# Patient Record
Sex: MALE | Race: BLACK OR AFRICAN AMERICAN | Employment: OTHER | URBAN - METROPOLITAN AREA
[De-identification: names, ages, dates, MRNs, and addresses within clinical notes are randomized per-mention and may not be internally consistent; named-entity substitution may affect disease eponyms.]

---

## 2022-10-12 ENCOUNTER — APPOINTMENT (OUTPATIENT)
Dept: GENERAL RADIOLOGY | Age: 72
DRG: 809 | End: 2022-10-12
Attending: PHYSICIAN ASSISTANT
Payer: MEDICARE

## 2022-10-12 ENCOUNTER — HOSPITAL ENCOUNTER (INPATIENT)
Age: 72
LOS: 2 days | Discharge: HOME | DRG: 809 | End: 2022-10-14
Attending: EMERGENCY MEDICINE | Admitting: HOSPITALIST
Payer: MEDICARE

## 2022-10-12 DIAGNOSIS — M10.9 ACUTE GOUT OF RIGHT FOOT, UNSPECIFIED CAUSE: ICD-10-CM

## 2022-10-12 DIAGNOSIS — D61.818 PANCYTOPENIA (HCC): Primary | ICD-10-CM

## 2022-10-12 DIAGNOSIS — I10 PRIMARY HYPERTENSION: ICD-10-CM

## 2022-10-12 PROBLEM — I88.0 MESENTERIC LYMPHADENITIS: Status: ACTIVE | Noted: 2022-10-12

## 2022-10-12 PROBLEM — K76.6 PORTAL HYPERTENSION (HCC): Status: ACTIVE | Noted: 2022-10-12

## 2022-10-12 PROBLEM — R16.1 SPLENOMEGALY: Status: ACTIVE | Noted: 2022-10-12

## 2022-10-12 PROBLEM — I85.00 ESOPHAGEAL VARICES (HCC): Status: ACTIVE | Noted: 2022-10-12

## 2022-10-12 PROBLEM — N40.0 PROSTATE ENLARGEMENT: Status: ACTIVE | Noted: 2022-10-12

## 2022-10-12 PROBLEM — D45 POLYCYTHEMIA VERA (HCC): Status: ACTIVE | Noted: 2022-10-12

## 2022-10-12 LAB
ALBUMIN SERPL-MCNC: 3 G/DL (ref 3.4–5)
ALBUMIN/GLOB SERPL: 0.9 {RATIO} (ref 0.8–1.7)
ALP SERPL-CCNC: 43 U/L (ref 45–117)
ALT SERPL-CCNC: 18 U/L (ref 16–61)
ANION GAP SERPL CALC-SCNC: 4 MMOL/L (ref 3–18)
AST SERPL-CCNC: 19 U/L (ref 10–38)
BASOPHILS # BLD: 0 K/UL (ref 0–0.1)
BASOPHILS # BLD: 0 K/UL (ref 0–0.1)
BASOPHILS NFR BLD: 0 % (ref 0–2)
BASOPHILS NFR BLD: 0 % (ref 0–2)
BILIRUB DIRECT SERPL-MCNC: 0.5 MG/DL (ref 0–0.2)
BILIRUB SERPL-MCNC: 1.3 MG/DL (ref 0.2–1)
BUN SERPL-MCNC: 25 MG/DL (ref 7–18)
BUN/CREAT SERPL: 20 (ref 12–20)
CALCIUM SERPL-MCNC: 8.7 MG/DL (ref 8.5–10.1)
CHLORIDE SERPL-SCNC: 109 MMOL/L (ref 100–111)
CO2 SERPL-SCNC: 28 MMOL/L (ref 21–32)
CREAT SERPL-MCNC: 1.24 MG/DL (ref 0.6–1.3)
DIFFERENTIAL METHOD BLD: ABNORMAL
DIFFERENTIAL METHOD BLD: ABNORMAL
EOSINOPHIL # BLD: 0.1 K/UL (ref 0–0.4)
EOSINOPHIL # BLD: 0.1 K/UL (ref 0–0.4)
EOSINOPHIL NFR BLD: 3 % (ref 0–5)
EOSINOPHIL NFR BLD: 4 % (ref 0–5)
ERYTHROCYTE [DISTWIDTH] IN BLOOD BY AUTOMATED COUNT: 24.5 % (ref 11.6–14.5)
ERYTHROCYTE [DISTWIDTH] IN BLOOD BY AUTOMATED COUNT: 24.9 % (ref 11.6–14.5)
GLOBULIN SER CALC-MCNC: 3.4 G/DL (ref 2–4)
GLUCOSE SERPL-MCNC: 145 MG/DL (ref 74–99)
HCT VFR BLD AUTO: 27.4 % (ref 36–48)
HCT VFR BLD AUTO: 28.8 % (ref 36–48)
HEMOCCULT STL QL: NEGATIVE
HGB BLD-MCNC: 9.1 G/DL (ref 13–16)
HGB BLD-MCNC: 9.5 G/DL (ref 13–16)
IMM GRANULOCYTES # BLD AUTO: 0.2 K/UL (ref 0–0.04)
IMM GRANULOCYTES # BLD AUTO: 0.3 K/UL (ref 0–0.04)
IMM GRANULOCYTES NFR BLD AUTO: 7 % (ref 0–0.5)
IMM GRANULOCYTES NFR BLD AUTO: 8 % (ref 0–0.5)
INR PPP: 1.3 (ref 0.8–1.2)
LACTATE BLD-SCNC: 1.28 MMOL/L (ref 0.4–2)
LYMPHOCYTES # BLD: 0.5 K/UL (ref 0.9–3.6)
LYMPHOCYTES # BLD: 0.6 K/UL (ref 0.9–3.6)
LYMPHOCYTES NFR BLD: 17 % (ref 21–52)
LYMPHOCYTES NFR BLD: 20 % (ref 21–52)
MCH RBC QN AUTO: 29.3 PG (ref 24–34)
MCH RBC QN AUTO: 29.4 PG (ref 24–34)
MCHC RBC AUTO-ENTMCNC: 33 G/DL (ref 31–37)
MCHC RBC AUTO-ENTMCNC: 33.2 G/DL (ref 31–37)
MCV RBC AUTO: 88.7 FL (ref 78–100)
MCV RBC AUTO: 88.9 FL (ref 78–100)
MONOCYTES # BLD: 0.1 K/UL (ref 0.05–1.2)
MONOCYTES # BLD: 0.2 K/UL (ref 0.05–1.2)
MONOCYTES NFR BLD: 4 % (ref 3–10)
MONOCYTES NFR BLD: 5 % (ref 3–10)
NEUTS SEG # BLD: 1.9 K/UL (ref 1.8–8)
NEUTS SEG # BLD: 2.2 K/UL (ref 1.8–8)
NEUTS SEG NFR BLD: 64 % (ref 40–73)
NEUTS SEG NFR BLD: 68 % (ref 40–73)
NRBC # BLD: 0 K/UL (ref 0–0.01)
NRBC # BLD: 0 K/UL (ref 0–0.01)
NRBC BLD-RTO: 0 PER 100 WBC
NRBC BLD-RTO: 0 PER 100 WBC
PLATELET # BLD AUTO: 30 K/UL (ref 135–420)
PLATELET # BLD AUTO: 30 K/UL (ref 135–420)
PLATELET COMMENTS,PCOM: ABNORMAL
PLATELET COMMENTS,PCOM: ABNORMAL
POTASSIUM SERPL-SCNC: 3.8 MMOL/L (ref 3.5–5.5)
PROT SERPL-MCNC: 6.4 G/DL (ref 6.4–8.2)
PROTHROMBIN TIME: 16.3 SEC (ref 11.5–15.2)
RBC # BLD AUTO: 3.09 M/UL (ref 4.35–5.65)
RBC # BLD AUTO: 3.24 M/UL (ref 4.35–5.65)
RBC MORPH BLD: ABNORMAL
SODIUM SERPL-SCNC: 141 MMOL/L (ref 136–145)
URATE SERPL-MCNC: 6.1 MG/DL (ref 2.6–7.2)
WBC # BLD AUTO: 3 K/UL (ref 4.6–13.2)
WBC # BLD AUTO: 3.2 K/UL (ref 4.6–13.2)

## 2022-10-12 PROCEDURE — 84550 ASSAY OF BLOOD/URIC ACID: CPT

## 2022-10-12 PROCEDURE — 83605 ASSAY OF LACTIC ACID: CPT

## 2022-10-12 PROCEDURE — 74011250637 HC RX REV CODE- 250/637: Performed by: HOSPITALIST

## 2022-10-12 PROCEDURE — 73630 X-RAY EXAM OF FOOT: CPT

## 2022-10-12 PROCEDURE — 88185 FLOWCYTOMETRY/TC ADD-ON: CPT

## 2022-10-12 PROCEDURE — 99285 EMERGENCY DEPT VISIT HI MDM: CPT

## 2022-10-12 PROCEDURE — 85610 PROTHROMBIN TIME: CPT

## 2022-10-12 PROCEDURE — 88184 FLOWCYTOMETRY/ TC 1 MARKER: CPT

## 2022-10-12 PROCEDURE — 82272 OCCULT BLD FECES 1-3 TESTS: CPT

## 2022-10-12 PROCEDURE — 36415 COLL VENOUS BLD VENIPUNCTURE: CPT

## 2022-10-12 PROCEDURE — 65270000029 HC RM PRIVATE

## 2022-10-12 PROCEDURE — 73110 X-RAY EXAM OF WRIST: CPT

## 2022-10-12 PROCEDURE — 85025 COMPLETE CBC W/AUTO DIFF WBC: CPT

## 2022-10-12 PROCEDURE — 73130 X-RAY EXAM OF HAND: CPT

## 2022-10-12 PROCEDURE — 80076 HEPATIC FUNCTION PANEL: CPT

## 2022-10-12 PROCEDURE — 80048 BASIC METABOLIC PNL TOTAL CA: CPT

## 2022-10-12 RX ORDER — DIPHENHYDRAMINE HCL 12.5MG/5ML
12.5 LIQUID (ML) ORAL
COMMUNITY

## 2022-10-12 RX ORDER — NAPROXEN 500 MG/1
500 TABLET ORAL 2 TIMES DAILY WITH MEALS
COMMUNITY

## 2022-10-12 RX ORDER — HYDROXYUREA 500 MG/1
1000 CAPSULE ORAL DAILY
COMMUNITY
End: 2022-10-14

## 2022-10-12 RX ORDER — TAMSULOSIN HYDROCHLORIDE 0.4 MG/1
0.4 CAPSULE ORAL DAILY
COMMUNITY

## 2022-10-12 RX ORDER — DICLOFENAC SODIUM 10 MG/G
GEL TOPICAL AS NEEDED
COMMUNITY

## 2022-10-12 RX ORDER — LOSARTAN POTASSIUM 50 MG/1
100 TABLET ORAL DAILY
Status: DISCONTINUED | OUTPATIENT
Start: 2022-10-13 | End: 2022-10-14 | Stop reason: HOSPADM

## 2022-10-12 RX ORDER — PREDNISONE 20 MG/1
20 TABLET ORAL
Status: DISCONTINUED | OUTPATIENT
Start: 2022-10-13 | End: 2022-10-14 | Stop reason: HOSPADM

## 2022-10-12 RX ORDER — TRAMADOL HYDROCHLORIDE 50 MG/1
50 TABLET ORAL 2 TIMES DAILY
COMMUNITY

## 2022-10-12 RX ORDER — TRAMADOL HYDROCHLORIDE 50 MG/1
50 TABLET ORAL 2 TIMES DAILY
Status: DISCONTINUED | OUTPATIENT
Start: 2022-10-12 | End: 2022-10-14 | Stop reason: HOSPADM

## 2022-10-12 RX ADMIN — TRAMADOL HYDROCHLORIDE 50 MG: 50 TABLET ORAL at 18:42

## 2022-10-12 NOTE — PROGRESS NOTES
Assumed patient care. Patient arrived from ED. Patient is alert and oriented, resting on bed, family in room with patient. Patient is been oriented to room. Bed at low position, wheels locked and call light within reach.

## 2022-10-12 NOTE — ED PROVIDER NOTES
EMERGENCY DEPARTMENT HISTORY & PHYSICAL EXAM    THE [facility] EMERGENCY DEPT  10/12/2022, 9:56 AM    Clinical Impression:  1. Pancytopenia ([address]    2. Acute gout of right foot, unspecified cause    3. Primary hypertension        Assessment/Differential Diagnosis:     Ddx arthritic process, trauma, infection, gout, all considered    ED Course:   Initial assessment performed. The patients presenting problems have been discussed, and they are in agreement with the care plan formulated and outlined with them. I have encouraged them to ask questions as they arise throughout their visit. Patient comes to ED with son-in-law. He complains of right great toe pain with swelling and warmth for several weeks. No known trauma. He believes this has happened before. No history of gout. Patient also complains of several week history of left hand pain along the medial aspect into the wrist.  He believes he may have hit his hand several weeks ago at the start of pain. No fever, chills or flulike illness. He is otherwise feeling well other than pain to these 2 areas. Exam with elderly gentleman who appears well. Blood pressure 98/70. Afebrile. Patient appears nontoxic. Right foot with erythematous warm swollen tender area at his first MTP. Obvious bony defect. Toes are neurovascular intact. No open lesions or sores. Examination of his left hand reveals significant deformity, likely arthritic changes at the end and third MCP. There is no pain to this area. Patient has point tenderness at the fifth metacarpal and MCP. No obvious bony defect. Slight swelling is noted. Significant pain with attempting to flex. Flexors intact. Patient also has some slight tenderness at the snuffbox. Skin intact with no signs of obvious trauma. Reviewed patient's records and he usually has a blood pressure between 682 and 533 systolic.   His blood pressure today is 98/70 which is close to his baseline, but given his age and warm joint we will check some basic blood work, lactic acid and x-rays    Work-up reveals patient is pancytopenic with a platelet count of 30. Discussion with patient and son-in-law. No history of blood disorders. Never seen by hematologist.  No history of anemia or blood transfusions. He denies any recent episodes of bleeding. No black or bloody stools. Discussed case with hospitalist, Dr. Yunior Padron, who requests repeat CBC    Repeat CBC reveals again pancytopenia    Discussed with hospitalist, Dr. Yunior Padron, who will admit. No request for further evaluation or treatment. Medical Chart Review:  I have reviewed triage nursing documentation. Review of old medical records with the following pertinent information:       Disposition: admit. Chief Complaint   Patient presents with    Foot Pain    Hand Pain     HPI:    The history is provided by patient and son lucian. No  used. Lynette Jeffrey is a 81 y.o. male presenting to the Emergency Department with complaints of right foot pain for the past several weeks to month. Patient recalls having similar pain in this area before. No history of gout that he can recall. He denies any trauma. He has noticed some warmth, redness and tenderness at the base of his right great toe. No other areas of pain. He denies any open sores or lesions. No ankle, calf, knee or hip pain. No medication taken for symptoms. He is also complaining of left hand pain for the past several weeks. He states he may have hit his hand a few weeks ago when he tried to bat a butterfly. He is having pain and swelling along the medial aspect of his hand into his wrist.  No previous similar pain. He is right-hand dominant. He denies any open sores or lesions. Sign patient states he has been feeling well otherwise. He denies any shortness of breath, chest pain, feeling lightheaded, fever, chills or flulike illness.   Patient does have history of hypertension for which she takes medications. I have reviewed all PMHX, FMHX and Social Hx as entered into the medical record in the chart below using the Epic Template. Review of Systems:  Constitutional: neg for fever, chills  ENT:  neg for URI symptoms  Respiratory:  neg for cough, shortness of breath  Cardiovascular:  neg for chest pain  GI:  neg for abdominal pain. :  No Flank pain. MSK: positive for trauma. Integumentary: no skin trauma  Neurological: neg for headaches  All other systems reviewed negative with exception of positives in ROS and HPI. Past Medical History:  Past Medical History:   Diagnosis Date    Arthritis     Hypertension        Past Surgical History:  Past Surgical History:   Procedure Laterality Date    HX ORTHOPAEDIC Left 1970's    hand surgery    HX ORTHOPAEDIC  2015    buldging disk in neck       Family History:  History reviewed. No pertinent family history. Social History:  Social History     Tobacco Use    Smoking status: Former    Smokeless tobacco: Never   Substance Use Topics    Alcohol use: No    Drug use: No       Allergies:  No Known Allergies    Vital Signs:  Vitals:    10/12/22 0919 10/12/22 1417   BP: 98/70 117/65   Pulse: 80 82   Resp: 17 18   Temp: 98.1 °F (36.7 °C)    SpO2: 99% 98%   Weight: 59 kg (130 lb)    Height: 5' 6\" (1.676 m)      Physical Exam:  Vital Signs Reviewed. Nursing Notes Reviewed. Constitutional:  Well developed, well nourished patient. Appearance and behavior are age and situation appropriate. Head: Normocephalic, Atraumatic  Eyes: Conjunctiva clear, lids normal. Sclera anicteric. ENT:hearing grossly intact  Neck:  supple, FROM  Lungs: No respiratory distress. Lungs CTAB   CV:  RR&R without murmur  Extremities: Left hand reveals some deformity, likely arthritic change to his second and third M CP. There is no tenderness to this area.   Patient does have point tenderness with pain along the fifth MCP into the metacarpal area.  No obvious bony defect. Slight swelling is noted. Patient has increased pain with attempting to flex the fifth digit but tendons are intact. Skin is intact with no signs of trauma or rash. No open sores. Examination of his left wrist does reveal some slight tenderness along the snuffbox. No bony defect. Examination of his right foot does reveal some swelling, warmth and redness at his first MTP. Skin is intact. Foot neurovascular intact otherwise. No swelling at the ankle. No calf tenderness. No pain with flexion extension at the knee, internal or external rotation of the hip. Neuro:  A&O x 3. CN II-XII grossly intact. No gross neuro deficits. Skin:  Warm, dry    Diagnostics:    Labs -     Recent Results (from the past 12 hour(s))   POC LACTIC ACID    Collection Time: 10/12/22  9:55 AM   Result Value Ref Range    Lactic Acid (POC) 1.28 0.40 - 2.00 mmol/L   CBC WITH AUTOMATED DIFF    Collection Time: 10/12/22  9:58 AM   Result Value Ref Range    WBC 3.2 (L) 4.6 - 13.2 K/uL    RBC 3.24 (L) 4.35 - 5.65 M/uL    HGB 9.5 (L) 13.0 - 16.0 g/dL    HCT 28.8 (L) 36.0 - 48.0 %    MCV 88.9 78.0 - 100.0 FL    MCH 29.3 24.0 - 34.0 PG    MCHC 33.0 31.0 - 37.0 g/dL    RDW 24.5 (H) 11.6 - 14.5 %    PLATELET 30 (L) 064 - 420 K/uL    NRBC 0.0 0  WBC    ABSOLUTE NRBC 0.00 0.00 - 0.01 K/uL    NEUTROPHILS 68 40 - 73 %    LYMPHOCYTES 17 (L) 21 - 52 %    MONOCYTES 4 3 - 10 %    EOSINOPHILS 3 0 - 5 %    BASOPHILS 0 0 - 2 %    IMMATURE GRANULOCYTES 8 (H) 0.0 - 0.5 %    ABS. NEUTROPHILS 2.2 1.8 - 8.0 K/UL    ABS. LYMPHOCYTES 0.5 (L) 0.9 - 3.6 K/UL    ABS. MONOCYTES 0.1 0.05 - 1.2 K/UL    ABS. EOSINOPHILS 0.1 0.0 - 0.4 K/UL    ABS. BASOPHILS 0.0 0.0 - 0.1 K/UL    ABS. IMM.  GRANS. 0.3 (H) 0.00 - 0.04 K/UL    DF AUTOMATED      PLATELET COMMENTS DECREASED PLATELETS      RBC COMMENTS ANISOCYTOSIS  2+        RBC COMMENTS TEARDROP CELLS  1+        RBC COMMENTS MICROCYTOSIS  2+        RBC COMMENTS OVALOCYTES  1+        RBC COMMENTS SCHISTOCYTES  1+       METABOLIC PANEL, BASIC    Collection Time: 10/12/22  9:58 AM   Result Value Ref Range    Sodium 141 136 - 145 mmol/L    Potassium 3.8 3.5 - 5.5 mmol/L    Chloride 109 100 - 111 mmol/L    CO2 28 21 - 32 mmol/L    Anion gap 4 3.0 - 18 mmol/L    Glucose 145 (H) 74 - 99 mg/dL    BUN 25 (H) 7.0 - 18 MG/DL    Creatinine 1.24 0.6 - 1.3 MG/DL    BUN/Creatinine ratio 20 12 - 20      eGFR >60 >60 ml/min/1.73m2    Calcium 8.7 8.5 - 10.1 MG/DL   URIC ACID    Collection Time: 10/12/22  9:58 AM   Result Value Ref Range    Uric acid 6.1 2.6 - 7.2 MG/DL   HEPATIC FUNCTION PANEL    Collection Time: 10/12/22  9:58 AM   Result Value Ref Range    Protein, total 6.4 6.4 - 8.2 g/dL    Albumin 3.0 (L) 3.4 - 5.0 g/dL    Globulin 3.4 2.0 - 4.0 g/dL    A-G Ratio 0.9 0.8 - 1.7      Bilirubin, total 1.3 (H) 0.2 - 1.0 MG/DL    Bilirubin, direct 0.5 (H) 0.0 - 0.2 MG/DL    Alk. phosphatase 43 (L) 45 - 117 U/L    AST (SGOT) 19 10 - 38 U/L    ALT (SGPT) 18 16 - 61 U/L   OCCULT BLOOD, STOOL    Collection Time: 10/12/22 11:23 AM   Result Value Ref Range    Occult blood, stool Negative NEG     CBC WITH AUTOMATED DIFF    Collection Time: 10/12/22 12:00 PM   Result Value Ref Range    WBC 3.0 (L) 4.6 - 13.2 K/uL    RBC 3.09 (L) 4.35 - 5.65 M/uL    HGB 9.1 (L) 13.0 - 16.0 g/dL    HCT 27.4 (L) 36.0 - 48.0 %    MCV 88.7 78.0 - 100.0 FL    MCH 29.4 24.0 - 34.0 PG    MCHC 33.2 31.0 - 37.0 g/dL    RDW 24.9 (H) 11.6 - 14.5 %    PLATELET 30 (L) 006 - 420 K/uL    NRBC 0.0 0  WBC    ABSOLUTE NRBC 0.00 0.00 - 0.01 K/uL    NEUTROPHILS 64 40 - 73 %    LYMPHOCYTES 20 (L) 21 - 52 %    MONOCYTES 5 3 - 10 %    EOSINOPHILS 4 0 - 5 %    BASOPHILS 0 0 - 2 %    IMMATURE GRANULOCYTES 7 (H) 0.0 - 0.5 %    ABS. NEUTROPHILS 1.9 1.8 - 8.0 K/UL    ABS. LYMPHOCYTES 0.6 (L) 0.9 - 3.6 K/UL    ABS. MONOCYTES 0.2 0.05 - 1.2 K/UL    ABS. EOSINOPHILS 0.1 0.0 - 0.4 K/UL    ABS. BASOPHILS 0.0 0.0 - 0.1 K/UL    ABS. IMM.  GRANS. 0.2 (H) 0.00 - 0.04 K/UL DF AUTOMATED      PLATELET COMMENTS DECREASED PLATELETS      RBC COMMENTS ANISOCYTOSIS  2+        RBC COMMENTS SCHISTOCYTES  1+        RBC COMMENTS TEARDROP CELLS  2+        RBC COMMENTS OVALOCYTES  1+           Radiologic Studies -   XR HAND LT MIN 3 V   Final Result      No evidence of acute fracture or dislocation. Radiodense wire wire appearing material within the dorsal soft tissues overlying   the wrist. Correlate clinically. XR WRIST LT AP/LAT/OBL MIN 3V   Final Result      No evidence of acute fracture or dislocation. Radiodense wire wire appearing material within the dorsal soft tissues overlying   the wrist. Correlate clinically. XR FOOT RT MIN 3 V   Final Result      No evidence of acute fracture or dislocation. Hallux valgus and first MTP joint arthrosis. CT Results  (Last 48 hours)      None          CXR Results  (Last 48 hours)      None            Medications given in the ED-  Medications - No data to display    Please note that this dictation was completed with [employer] the computer voice recognition software. Quite often unanticipated grammatical, syntax, homophones, and other interpretive errors are inadvertently transcribed by the computer software. Please disregard these errors. Please excuse any errors that have escaped final proofreading.

## 2022-10-12 NOTE — ED TRIAGE NOTES
Patient arrives with son with complaints of left hand pain/swelling xmonths and right foot pain that has also been going on for some time

## 2022-10-12 NOTE — H&P
History & Physical    Patient: Lizet Bearden MRN: [id number]  CSN: [id number]    YOB: 1950  Age: 81 y.o. Sex: male      DOA: 10/12/2022  Primary Care Provider:  Freya Santamaria MD      Assessment/Plan     Patient Active Problem List   Diagnosis Code    DDD (degenerative disc disease), cervical M50.30    Pancytop[address] ([facility] [address] D61.818    Polycythemia vera ([facility] [address] D45    Mesenteric lymphadenitis I88.0    Portal hypertension ([facility] [address] K76.6    Splenomegaly R16.1    Esophageal varices (HCC) I85.00    Hypertension I10    Prostate enlargement N40.0       Admit to medical floor    Pancytopenia -  In a patient with history of polycythemia vera, on hydrea. Discussed with hematology Dr. Sabas Noyola, recommend stopping hydrea. Will see patient tomorrow    HTN -  Continue home medications   Monitor BP    Left hand/wrist swelling and pain -  Unclear cause, X-ray with no acute findings  Will start on prednisone  Pain control    Portal HTN -  Monitor liver function. No heparin products secondary to thrombocytopenia  Check coags    Estimated length of stay : 2-3 days    CC: left hand and right toe pain       HPI:     Lizet Recio. is a 81 y.o. male with HTN, arthritis, esophageal varices, polycythemia vera on Hydrea, portal hypertension, esophageal varices, mesenteric lymphadenitis, splenomegaly, prostate enlargement presents to ER with complaints of left hand/wrist swelling and right great toe pain. Patient reports that the pain has been there for few months. He has seen his PCP, he was placed on prednisone. X-ray did not show any acute findings. Per son-in-law who brought the patient his left wrist swelling comes and goes. He has history of polycythemia vera and takes Hydrea, he is followed by Dr. Shaji Hobson. Called and discussed with hematology Dr. Sabas Noyola. Patient has history of polycythemia vera and he is on Hydrea. He has baseline anemia.   However his WBC and platelets were normal.  In ER he is noted to Regarding: fever, body aches, chills, runny nose, vomiting, loss of taste, diarrhea   ----- Message from Rachael Amaral sent at 4/23/2020 10:52 AM CDT -----  Patient Name: Kasandra Vance    Specialist or PCP Full Name: Magdalena Phillips     Pregnant (If Yes, how long?): No     Symptoms: fever, body aches, chills, runny nose, vomiting, loss of taste, diarrhea     Do you or any of your household members have the following:  Fever > 100.4#F: Yes     New or worsening cough: No    Shortness of breath: No    Sore throat: No    New onset of nausea, vomiting or diarrhea: Yes, vomiting and diarrhea     New onset of loss of taste or smell: Yes, taste     Have you or a household member tested positive for COVID-19 in the last 14 days?: No but was in close contact with a coworker who tested positive     Call Back #: [phone]    Call Center Account #: N/A     Please update the Demographics section with the patients permanent resident address      have WBC of 3, H&H of 9.1/27.4, platelets of 30. No acute findings on x-ray of right foot and left hand. Past Medical History:   Diagnosis Date    Arthritis     Esophageal varices (HCC)     Hypertension     Mesenteric lymphadenitis     Polycythemia vera ([address]     Portal hypertension (HCC)     Prostate enlargement     Splenomegaly        Past Surgical History:   Procedure Laterality Date    HX ORTHOPAEDIC Left 1970's    hand surgery    HX ORTHOPAEDIC  2015    buldging disk in neck       History reviewed. No pertinent family history. Social History     Socioeconomic History    Marital status:    Tobacco Use    Smoking status: Former    Smokeless tobacco: Never   Substance and Sexual Activity    Alcohol use: No    Drug use: No       Prior to Admission medications    Medication Sig Start Date End Date Taking? Authorizing Provider   diphenhydrAMINE (BENADRYL) 12.5 mg/5 mL oral liquid Take 12.5 mg by mouth four (4) times daily as needed. Yes Provider, Historical   traMADoL (ULTRAM) 50 mg tablet Take 50 mg by mouth two (2) times a day. Yes Provider, Historical   naproxen (NAPROSYN) 500 mg tablet Take 500 mg by mouth two (2) times daily (with meals). Yes Provider, Historical   tamsulosin (FLOMAX) 0.4 mg capsule Take 0.4 mg by mouth daily. Yes Provider, Historical   diclofenac (Voltaren) 1 % gel Apply  to affected area as needed for Pain. Yes Provider, Historical   losartan (COZAAR) 50 mg tablet Take 100 mg by mouth daily. Instructed to take morning of surgery with small sip of water   Indications: HYPERTENSION   Yes Provider, Historical   hydroxyurea (HYDREA) 500 mg capsule Take 1,000 mg by mouth daily. Provider, Historical       No Known Allergies    Review of Systems  Gen: No fever, chills, malaise, weight loss/gain. Heent: No headache, rhinorrhea, epistaxis, ear pain, hearing loss, sinus pain, neck pain/stiffness, sore throat. Heart: No chest pain, palpitations, ESPINAL, pnd, or orthopnea. Resp: No cough, hemoptysis, wheezing and shortness of breath. GI: No nausea, vomiting, diarrhea, constipation, melena or hematochezia. : No urinary obstruction, dysuria or hematuria. Derm: No rash, new skin lesion or pruritis. Musc/skeletal: see above. Vasc: No edema, cyanosis or claudication. Endo: No heat/cold intolerance, no polyuria,polydipsia or polyphagia. Neuro: No unilateral weakness, numbness, tingling. No seizures. Heme: see above          Physical Exam:     Physical Exam:  Visit Vitals  BP (!) 141/68   Pulse (!) 58   Temp 98.6 °F (37 °C)   Resp 17   Ht 5' 6\" (1.676 m)   Wt 59 kg (130 lb)   SpO2 100%   BMI 20.98 kg/m²      O2 Device: None (Room air)    Temp (24hrs), Av.4 °F (36.9 °C), Min:98.1 °F (36.7 °C), Max:98.6 °F (37 °C)    No intake/output data recorded. No intake/output data recorded. General:  Awake, cooperative, no distress. Head:  Normocephalic, without obvious abnormality, atraumatic. Eyes:  Conjunctivae/corneas clear, sclera anicteric, PERRL, EOMs intact. Nose: Nares normal. No drainage or sinus tenderness. Throat: Lips, mucosa, and tongue normal.    Neck: Supple, symmetrical, trachea midline, no adenopathy. Lungs:   Clear to auscultation bilaterally. Heart:   S1, S2, no murmur, click, rub or gallop. Abdomen: Soft, non-tender. Bowel sounds normal. No masses,  No organomegaly. Extremities: Left hand wrist swelling, TTP and ROM, right 1st MTP TTP. Pulses: 2+ and symmetric all extremities. Skin: Skin color pink, turgor normal. No rashes or lesions   Neurologic: CNII-XII intact. No focal motor or sensory deficit.        Labs Reviewed:    CMP:   Lab Results   Component Value Date/Time     10/12/2022 09:58 AM    K 3.8 10/12/2022 09:58 AM     10/12/2022 09:58 AM    CO2 28 10/12/2022 09:58 AM    AGAP 4 10/12/2022 09:58 AM     (H) 10/12/2022 09:58 AM    BUN 25 (H) 10/12/2022 09:58 AM    CREA 1.24 10/12/2022 09:58 AM    CA 8.7 10/12/2022 09:58 AM    ALB 3.0 (L) 10/12/2022 09:58 AM    TP 6.4 10/12/2022 09:58 AM    GLOB 3.4 10/12/2022 09:58 AM    AGRAT 0.9 10/12/2022 09:58 AM    ALT 18 10/12/2022 09:58 AM     CBC:   Lab Results   Component Value Date/Time    WBC 3.0 (L) 10/12/2022 12:00 PM    HGB 9.1 (L) 10/12/2022 12:00 PM    HCT 27.4 (L) 10/12/2022 12:00 PM    PLT 30 (L) 10/12/2022 12:00 PM         Procedures/imaging: see electronic medical records for all procedures/Xrays and details which were not copied into this note but were reviewed prior to creation of Plan    Please note that this dictation was completed with [employer], the computer voice recognition software. Quite often unanticipated grammatical, syntax, homophones, and other interpretive errors are inadvertently transcribed by the computer software. Please disregard these errors. Please excuse any errors that have escaped final proofreading.         CC: Bettie Hummel MD

## 2022-10-12 NOTE — Clinical Note
Status[de-identified] INPATIENT [101]   Type of Bed: Medical [8]   Cardiac Monitoring Required?: No   Inpatient Hospitalization Certified Necessary for the Following Reasons: 3.  Patient receiving treatment that can only be provided in an inpatient setting (further clarification in H&P documentation)   Admitting Diagnosis: Pancytopenia [facility] [[id number]]   Admitting Physician: Luis Enrique Amanda [[id number]]   Attending Physician: Luis Enrique Amanda [[id number]]   Estimated Length of Stay: 3-4 Midnights   Discharge Plan[de-identified] Home with Office Follow-up

## 2022-10-13 PROBLEM — E43 SEVERE PROTEIN-CALORIE MALNUTRITION (HCC): Status: ACTIVE | Noted: 2022-10-13

## 2022-10-13 LAB
ALBUMIN SERPL-MCNC: 2.7 G/DL (ref 3.4–5)
ALBUMIN/GLOB SERPL: 0.7 {RATIO} (ref 0.8–1.7)
ALP SERPL-CCNC: 47 U/L (ref 45–117)
ALT SERPL-CCNC: 22 U/L (ref 16–61)
ANION GAP SERPL CALC-SCNC: 5 MMOL/L (ref 3–18)
AST SERPL-CCNC: 24 U/L (ref 10–38)
BASOPHILS # BLD: 0 K/UL (ref 0–0.1)
BASOPHILS NFR BLD: 0 % (ref 0–2)
BILIRUB SERPL-MCNC: 0.9 MG/DL (ref 0.2–1)
BUN SERPL-MCNC: 19 MG/DL (ref 7–18)
BUN/CREAT SERPL: 20 (ref 12–20)
CALCIUM SERPL-MCNC: 8.7 MG/DL (ref 8.5–10.1)
CHLORIDE SERPL-SCNC: 108 MMOL/L (ref 100–111)
CO2 SERPL-SCNC: 27 MMOL/L (ref 21–32)
CREAT SERPL-MCNC: 0.97 MG/DL (ref 0.6–1.3)
DIFFERENTIAL METHOD BLD: ABNORMAL
EOSINOPHIL # BLD: 0.1 K/UL (ref 0–0.4)
EOSINOPHIL NFR BLD: 4 % (ref 0–5)
ERYTHROCYTE [DISTWIDTH] IN BLOOD BY AUTOMATED COUNT: 24.9 % (ref 11.6–14.5)
GLOBULIN SER CALC-MCNC: 3.8 G/DL (ref 2–4)
GLUCOSE SERPL-MCNC: 84 MG/DL (ref 74–99)
HCT VFR BLD AUTO: 28.1 % (ref 36–48)
HGB BLD-MCNC: 9.2 G/DL (ref 13–16)
IMM GRANULOCYTES # BLD AUTO: 0.3 K/UL (ref 0–0.04)
IMM GRANULOCYTES NFR BLD AUTO: 9 % (ref 0–0.5)
INR PPP: 1.3 (ref 0.8–1.2)
LYMPHOCYTES # BLD: 0.8 K/UL (ref 0.9–3.6)
LYMPHOCYTES NFR BLD: 26 % (ref 21–52)
MCH RBC QN AUTO: 29 PG (ref 24–34)
MCHC RBC AUTO-ENTMCNC: 32.7 G/DL (ref 31–37)
MCV RBC AUTO: 88.6 FL (ref 78–100)
MONOCYTES # BLD: 0.2 K/UL (ref 0.05–1.2)
MONOCYTES NFR BLD: 7 % (ref 3–10)
NEUTS SEG # BLD: 1.7 K/UL (ref 1.8–8)
NEUTS SEG NFR BLD: 54 % (ref 40–73)
NRBC # BLD: 0 K/UL (ref 0–0.01)
NRBC BLD-RTO: 0 PER 100 WBC
PERIPHERAL SMEAR,PSM: NORMAL
PLATELET # BLD AUTO: 28 K/UL (ref 135–420)
PLATELET COMMENTS,PCOM: ABNORMAL
POTASSIUM SERPL-SCNC: 4.8 MMOL/L (ref 3.5–5.5)
PROT SERPL-MCNC: 6.5 G/DL (ref 6.4–8.2)
PROTHROMBIN TIME: 16.2 SEC (ref 11.5–15.2)
RBC # BLD AUTO: 3.17 M/UL (ref 4.35–5.65)
RBC MORPH BLD: ABNORMAL
SODIUM SERPL-SCNC: 140 MMOL/L (ref 136–145)
WBC # BLD AUTO: 3.1 K/UL (ref 4.6–13.2)

## 2022-10-13 PROCEDURE — 74011250637 HC RX REV CODE- 250/637: Performed by: HOSPITALIST

## 2022-10-13 PROCEDURE — 65270000029 HC RM PRIVATE

## 2022-10-13 PROCEDURE — 85610 PROTHROMBIN TIME: CPT

## 2022-10-13 PROCEDURE — 85025 COMPLETE CBC W/AUTO DIFF WBC: CPT

## 2022-10-13 PROCEDURE — 36415 COLL VENOUS BLD VENIPUNCTURE: CPT

## 2022-10-13 PROCEDURE — 74011636637 HC RX REV CODE- 636/637: Performed by: HOSPITALIST

## 2022-10-13 PROCEDURE — 80053 COMPREHEN METABOLIC PANEL: CPT

## 2022-10-13 RX ADMIN — LOSARTAN POTASSIUM 100 MG: 50 TABLET, FILM COATED ORAL at 08:38

## 2022-10-13 RX ADMIN — PREDNISONE 20 MG: 20 TABLET ORAL at 08:38

## 2022-10-13 RX ADMIN — TRAMADOL HYDROCHLORIDE 50 MG: 50 TABLET ORAL at 08:38

## 2022-10-13 RX ADMIN — TRAMADOL HYDROCHLORIDE 50 MG: 50 TABLET ORAL at 18:59

## 2022-10-13 NOTE — PROGRESS NOTES
Hospitalist Progress Note    Patient: [place]. MRN: [id number]  CSN: [id number]    YOB: 1950  Age: 81 y.o. Sex: male    DOA: 10/12/2022 LOS:  LOS: 1 day                Assessment/Plan     Patient Active Problem List   Diagnosis Code    DDD (degenerative disc disease), cervical M50.30    Pancytopenia (HCC) D61.818    Polycythemia vera ([facility] [address] D45    Mesenteric lymphadenitis I88.0    Portal hypertension ([address] K76.6    Splenomegaly R16.1    Esophageal varices (HCC) I85.00    Hypertension I10    Prostate enlargement N40.0    Severe protein-calorie malnutrition ([facility] [address] E43        Chief complaint :  Pancytopenia, left hand and right foot pain. 81 y.o. male with HTN, arthritis, esophageal varices, polycythemia vera on Hydrea, portal hypertension, esophageal varices, mesenteric lymphadenitis, splenomegaly, prostate enlargement presents to ER with complaints of left hand/wrist swelling and right great toe pain. Pancytopenia -  In a patient with history of polycythemia vera, on hydrea. Hydrea on hold  Hematology following  Bone marrow biopsy ordered  Follow work up     HTN -  Continue home medications   Monitor BP     Left hand/wrist swelling and pain -  Unclear cause, X-ray reviewed  on prednisone  Pain control  Hand surgery consulted. Portal HTN -  Monitor liver function. No heparin products secondary to thrombocytopenia  Follow coags    Disposition : TBD    Review of systems  General: No fevers or chills. Cardiovascular: No chest pain or pressure. No palpitations. Pulmonary: No shortness of breath. Gastrointestinal: No nausea, vomiting. Physical Exam:  General: Awake, cooperative, no acute distress    HEENT: NC, Atraumatic. PERRLA, anicteric sclerae. Lungs: CTA Bilaterally. No Wheezing/Rhonchi/Rales. Heart:  S1 S2,  No murmur, No Rubs, No Gallops  Abdomen: Soft, Non distended, Non tender. +Bowel sounds,   Extremities: No c/c/e  Psych:   Not anxious or agitated.   Neurologic: No acute neurological deficit. Vital signs/Intake and Output:  Visit Vitals  /66 (BP 1 Location: Right upper arm, BP Patient Position: At rest;Lying)   Pulse (!) 54   Temp 98 °F (36.7 °C)   Resp 18   Ht 5' 5.98\" (1.676 m)   Wt 61.7 kg (136 lb)   SpO2 100%   BMI 21.96 kg/m²     Current Shift:  No intake/output data recorded. Last three shifts:  No intake/output data recorded. Labs: Results:       Chemistry Recent Labs     10/13/22  0645 10/12/22  0958   GLU 84 145*    141   K 4.8 3.8    109   CO2 27 28   BUN 19* 25*   CREA 0.97 1.24   CA 8.7 8.7   AGAP 5 4   BUCR 20 20   AP 47 43*   TP 6.5 6.4   ALB 2.7* 3.0*   GLOB 3.8 3.4   AGRAT 0.7* 0.9      CBC w/Diff Recent Labs     10/13/22  0645 10/12/22  1200 10/12/22  0958   WBC 3.1* 3.0* 3.2*   RBC 3.17* 3.09* 3.24*   HGB 9.2* 9.1* 9.5*   HCT 28.1* 27.4* 28.8*   PLT 28* 30* 30*   GRANS 54 64 68   LYMPH 26 20* 17*   EOS 4 4 3      Cardiac Enzymes No results for input(s): CPK, CKND1, EMERY in the last 72 hours. No lab exists for component: CKRMB, TROIP   Coagulation Recent Labs     10/13/22  0645 10/12/22  2055   PTP 16.2* 16.3*   INR 1.3* 1.3*       Lipid Panel No results found for: CHOL, CHOLPOCT, CHOLX, CHLST, CHOLV, 846474, HDL, HDLP, LDL, LDLC, DLDLP, 922807, VLDLC, VLDL, TGLX, TRIGL, TRIGP, TGLPOCT, CHHD, CHHDX   BNP No results for input(s): BNPP in the last 72 hours.    Liver Enzymes Recent Labs     10/13/22  0645   TP 6.5   ALB 2.7*   AP 47      Thyroid Studies No results found for: T4, T3U, TSH, TSHEXT     Procedures/imaging: see electronic medical records for all procedures/Xrays and details which were not copied into this note but were reviewed prior to creation of Plan

## 2022-10-13 NOTE — PROGRESS NOTES
Bedside shift change report given to Maddie Mcdowell (oncoming nurse) by Darline Armas RN   (offgoing nurse). Report included the following information SBAR, Kardex, Intake/Output, MAR, and Recent Results.

## 2022-10-13 NOTE — PROGRESS NOTES
BRIEF HEMATOLOGY NOTE    CBC:    Lab Results   Component Value Date/Time    WBC 3.0 (L) 10/12/2022 12:00 PM    HGB 9.1 (L) 10/12/2022 12:00 PM    HCT 27.4 (L) 10/12/2022 12:00 PM    PLT 30 (L) 10/12/2022 12:00 PM     Lab Results   Component Value Date/Time    Sodium 141 10/12/2022 09:58 AM    Potassium 3.8 10/12/2022 09:58 AM    Chloride 109 10/12/2022 09:58 AM    CO2 28 10/12/2022 09:58 AM    Anion gap 4 10/12/2022 09:58 AM    Glucose 145 (H) 10/12/2022 09:58 AM    BUN 25 (H) 10/12/2022 09:58 AM    Creatinine 1.24 10/12/2022 09:58 AM    BUN/Creatinine ratio 20 10/12/2022 09:58 AM    GFR est AA >60 06/12/2015 08:00 AM    GFR est non-AA >60 06/12/2015 08:00 AM    Calcium 8.7 10/12/2022 09:58 AM    Bilirubin, total 1.3 (H) 10/12/2022 09:58 AM    Alk. phosphatase 43 (L) 10/12/2022 09:58 AM    Protein, total 6.4 10/12/2022 09:58 AM    Albumin 3.0 (L) 10/12/2022 09:58 AM    Globulin 3.4 10/12/2022 09:58 AM    A-G Ratio 0.9 10/12/2022 09:58 AM    ALT (SGPT) 18 10/12/2022 09:58 AM    AST (SGOT) 19 10/12/2022 09:58 AM     A/P:  Mr. Jenn Kan is a 82 y/o M with a history of JAK2+ Polycythemia Vera who presented to the ED with L wrist and R ankle pain, was found to have pancytopenia. Hematology consulted on management:    #Pancytopenia:  He did not know his hydrea dose and based on the notes from clinic, it appears we were needing to decrease the dose of hydrea over the last year. This would be indicative of either progression to post PV myelofibrosis vs. Possible transition to leukemia.  Given this consideration, I believe a bone marrow biopsy is in his best interest to help guide management.   - Ordered bone marrow biopsy, consult to IR placed, changed to NPO   - Send for morphology, cytogenetics, FISH per path discretion, and next generation sequencing  - Hold hydrea for the time being  - Hold aspirin for now given thrombocytopenia  - Follow up flow cytometry from peripheral blood  - Full note to follow later in the day    #Possible gout:  - Continue prednisone per primary team    Javy Tony MD  [facility] Hematology/Oncology

## 2022-10-13 NOTE — PROGRESS NOTES
Reason for Admission:   left hand and right toe pain                     RUR Score:  Low; 12&                   Plan for utilizing home health:          PCP: First and Last name:  Carolynn Norris MD     Name of Practice:    Are you a current patient: Yes/No:    Approximate date of last visit:    Can you participate in a virtual visit with your PCP:                     Current Advanced Directive/Advance Care Plan: Full Code      Healthcare Decision Maker:   Click here to complete [address] including selection of the Healthcare Decision Maker Relationship (ie \"Primary\")                             Transition of Care Plan:   Home with physician follow up vs [facility] with physician follow up  pending clinical progression                  Chart reviewed. Per H&P \"Tristin Rg. is a 81 y.o. male with HTN, arthritis, esophageal varices, polycythemia vera on Hydrea, portal hypertension, esophageal varices, mesenteric lymphadenitis, splenomegaly, prostate enlargement presents to ER with complaints of left hand/wrist swelling and right great toe pain. Patient reports that the pain has been there for few months. He has seen his PCP, he was placed on prednisone. X-ray did not show any acute findings. Per son-in-law who brought the patient his left wrist swelling comes and goes. He has history of polycythemia vera and takes Hydrea, he is followed by Dr. Sheree Sandoval. Called and discussed with hematology Dr. Nathanael Parry. Patient has history of polycythemia vera and he is on Hydrea. He has baseline anemia. However his WBC and platelets were normal.  In ER he is noted to have WBC of 3, H&H of 9.1/27.4, platelets of 30. No acute findings on x-ray of right foot and left hand. \"    CM and provider rounded with pt at bedside to discuss care transition. Pt is  and his wife was just discharged from SNF and is able to provide limited assistance.   Pt's daughter and granddaughter are able to provide assistance if needed. Pt has access to a rollator if needed. Pt is pending ortho and oncology consults. CM to await outcome of consults to further assist with care transition. Please encourage ambulation as appropriate or order therapy services to assist with identifying an appropriate care transition. CM to continue to follow and assist as needed. Care Management Interventions  Mode of Transport at Discharge:  Other (see comment) (Family )  Transition of Care Consult (CM Consult): Discharge Planning  Health Maintenance Reviewed: Yes  Support Systems: Spouse/Significant Other, Other Family Member(s), Child(cliff)  The Plan for Transition of Care is Related to the Following Treatment Goals : Home with physician follow up vs [facility] with physician follow up  Discharge Location  Patient Expects to be Discharged to[de-identified] Home with family assistance (vs )

## 2022-10-13 NOTE — PROGRESS NOTES
Problem: Pain  Goal: *Control of Pain  Outcome: Progressing Towards Goal     Problem: Nutrition Deficit  Goal: *Optimize nutritional status  Outcome: Progressing Towards Goal     Problem: Patient Education: Go to Patient Education Activity  Goal: Patient/Family Education  Outcome: Progressing Towards Goal     Problem: Falls - Risk of  Goal: *Absence of Falls  Description: Document Cherylle Cockayne Fall Risk and appropriate interventions in the flowsheet.   Outcome: Progressing Towards Goal  Note: Fall Risk Interventions:            Medication Interventions: Patient to call before getting OOB, Teach patient to arise slowly         History of Falls Interventions: Consult care management for discharge planning, Door open when patient unattended, Investigate reason for fall, Room close to nurse's station         Problem: Patient Education: Go to Patient Education Activity  Goal: Patient/Family Education  Outcome: Progressing Towards Goal

## 2022-10-13 NOTE — CONSULTS
Phone: [phone]  Paging : [phone]     Hematology/Oncology Consult Note    Patient: Annel Lopez MRN: [id number]  CSN: [id number]    YOB: 1950  Age: 81 y.o. Sex: male    DOA: 10/12/2022 LOS:  LOS: 1 day            REASON FOR CONSULTATION:   Pancytopenia      ASSESSMENT:   Mr. Elba Pimentel is a 82 y/o M with a history of JAK2 V617F+ Polycythemia Vera who presented to the ED with L wrist and R ankle pain, was found to have pancytopenia. Hematology consulted on management:      PLAN:   #Pancytopenia:  Speaking to daughter, she thinks he may have been taking 1000mg of hydrea daily rather than 1000mg M-F and 500mg Sat/Sun. ? Toxicity from hydrea. My other concern would be that his pancytopenia is from progression to post PV MF (check fibrosis) vs Possible transition to leukemia. Given this consideration, I believe a bone marrow biopsy is in his best interest to help guide management.   - Ordered bone marrow biopsy, consult to IR placed, changed to NPO              - Send for morphology, cytogenetics, FISH per path discretion, and next generation sequencing  - Hold hydrea for the time being  - Hold aspirin for now given thrombocytopenia  - Follow up flow cytometry from peripheral blood     #Possible gout:  - Continue prednisone per primary team      HPI:     Annel Lopez is a 81 y.o., BLACK/, male, who I have been asked to see for pancytopenia. He has been having pain in his L wrist and R ankle for the last 6 months but in the last few days, his pain worsened to the point where he was unable to bend the respective joints and pain with minimal tenderness. He presented to the hospital and was found to be panycytopenic. He denies any fevers, chills, worsening abdominal pain, nausea, vomiting, or rash. He isn't sure what his dose of hydrea is but speaking with his daughter, he make be taking 1000mg PO daily. He followed with Dr. Darirn Mahmood.      Past Medical History: Diagnosis Date    Arthritis     Esophageal varices (HCC)     Hypertension     Mesenteric lymphadenitis     Polycythemia vera ([address]     Portal hypertension (HCC)     Prostate enlargement     Splenomegaly        Past Surgical History:   Procedure Laterality Date    HX ORTHOPAEDIC Left 1970's    hand surgery    HX ORTHOPAEDIC  2015    buldging disk in neck       History reviewed. No pertinent family history. Social History     Socioeconomic History    Marital status:    Tobacco Use    Smoking status: Former    Smokeless tobacco: Never   Substance and Sexual Activity    Alcohol use: No    Drug use: No       Current Facility-Administered Medications   Medication Dose Route Frequency    traMADoL (ULTRAM) tablet 50 mg  50 mg Oral BID    losartan (COZAAR) tablet 100 mg  100 mg Oral DAILY    predniSONE (DELTASONE) tablet 20 mg  20 mg Oral DAILY WITH BREAKFAST       Prior to Admission medications    Medication Sig Start Date End Date Taking? Authorizing Provider   diphenhydrAMINE (BENADRYL) 12.5 mg/5 mL oral liquid Take 12.5 mg by mouth four (4) times daily as needed. Yes Provider, Historical   traMADoL (ULTRAM) 50 mg tablet Take 50 mg by mouth two (2) times a day. Yes Provider, Historical   naproxen (NAPROSYN) 500 mg tablet Take 500 mg by mouth two (2) times daily (with meals). Yes Provider, Historical   tamsulosin (FLOMAX) 0.4 mg capsule Take 0.4 mg by mouth daily. Yes Provider, Historical   diclofenac (Voltaren) 1 % gel Apply  to affected area as needed for Pain. Yes Provider, Historical   losartan (COZAAR) 50 mg tablet Take 100 mg by mouth daily. Instructed to take morning of surgery with small sip of water   Indications: HYPERTENSION   Yes Provider, Historical   hydroxyurea (HYDREA) 500 mg capsule Take 1,000 mg by mouth daily. Provider, Historical       No Known Allergies      ROS:     CONSTITUTION: No fevers, chills, night sweats, anorexia, or weight loss. HEENT: No visual changes.  No change in hearing acuity, tinnitus, hoarseness, sore throat, or postnasal drip. CARDIOVASCULAR: No chest pain, palpitations, or syncope. No extremity edema. RESPIRATORY: No shortness of breath, cough, wheezing, or hemoptysis. GASTROINTESINAL: No dysphagia, odynophagia, nausea, or vomiting. No reflux symptoms, abdominal pain, or bloating. No constipation, diarrhea, or rectal bleeding. GENITOURINARY: no complaints. NEUROLOGICALl: No diplopia, dysarthria, weakness, or headaches. No seizures, focal weakness, or abnormal gait. PSYCHIATRIC: No anxiety, depression, or memory loss. HEMATOLOGIC: No easy bruising. No unusual bleeding. No enlarged lymph nodes. MUSCULOSKELETAL: No unusual joint or muscle ache. SKIN: No rash or pruritus. Physical Exam:      VITAL SIGNS: Patient Vitals for the past 24 hrs:   BP Temp Pulse Resp SpO2 Height Weight   10/13/22 0734 (!) 117/59 97.9 °F (36.6 °C) (!) 56 18 100 % -- --   10/13/22 0403 111/65 98.2 °F (36.8 °C) 82 17 100 % -- --   10/13/22 0021 126/70 98 °F (36.7 °C) (!) 57 18 100 % -- 61.7 kg (136 lb)   10/12/22 2014 125/82 97.9 °F (36.6 °C) -- 18 100 % -- --   10/12/22 1545 (!) 141/68 98.6 °F (37 °C) (!) 58 17 100 % -- --   10/12/22 1417 117/65 -- 82 18 98 % -- --   10/12/22 0919 98/70 98.1 °F (36.7 °C) 80 17 99 % 5' 6\" (1.676 m) 59 kg (130 lb)     GENERAL: normal appearance, no acute distress. HEENT: conjunctivae normal, eyelids normal, PERRLA, anicteric, external ears and nose normal, hearing grossly normal.   NECK: supple, no masses. LUNG: breathing comfortably, lungs clear to auscultation and percussion. CARDIOVASCULAR: normal heart sounds, heart rhythm regular, no peripheral edema. ABDOMEN: soft. bowel sounds normal, non-tender non distension, no hepatosplenomegaly  PELVIS: pelvic exam deferred. RECTUM: rectal exam deferred. LYMPH NODES: no cervical adenopathy, no axillary adenopathy, no supraclavicular adenopathy, no infraclavicular adenopathy.    SKIN: no rash, no petechiae, no ecchymosis, no pallor, no cutaneous nodules. MUSCULOSKELETAL: swelling diffusely of L hand up to wrist and R ankle, minimal erythema of both joints diffusely. NEUROLOGIC: no focal motor deficit, normal gait, no abnormal mental status. PSYCHIATRIC: oriented to person, time and place, mood and affect appropriate to situation. Ancillary Studies:     Bloodwork:  Recent Results (from the past 24 hour(s))   POC LACTIC ACID    Collection Time: 10/12/22  9:55 AM   Result Value Ref Range    Lactic Acid (POC) 1.28 0.40 - 2.00 mmol/L   CBC WITH AUTOMATED DIFF    Collection Time: 10/12/22  9:58 AM   Result Value Ref Range    WBC 3.2 (L) 4.6 - 13.2 K/uL    RBC 3.24 (L) 4.35 - 5.65 M/uL    HGB 9.5 (L) 13.0 - 16.0 g/dL    HCT 28.8 (L) 36.0 - 48.0 %    MCV 88.9 78.0 - 100.0 FL    MCH 29.3 24.0 - 34.0 PG    MCHC 33.0 31.0 - 37.0 g/dL    RDW 24.5 (H) 11.6 - 14.5 %    PLATELET 30 (L) 527 - 420 K/uL    NRBC 0.0 0  WBC    ABSOLUTE NRBC 0.00 0.00 - 0.01 K/uL    NEUTROPHILS 68 40 - 73 %    LYMPHOCYTES 17 (L) 21 - 52 %    MONOCYTES 4 3 - 10 %    EOSINOPHILS 3 0 - 5 %    BASOPHILS 0 0 - 2 %    IMMATURE GRANULOCYTES 8 (H) 0.0 - 0.5 %    ABS. NEUTROPHILS 2.2 1.8 - 8.0 K/UL    ABS. LYMPHOCYTES 0.5 (L) 0.9 - 3.6 K/UL    ABS. MONOCYTES 0.1 0.05 - 1.2 K/UL    ABS. EOSINOPHILS 0.1 0.0 - 0.4 K/UL    ABS. BASOPHILS 0.0 0.0 - 0.1 K/UL    ABS. IMM.  GRANS. 0.3 (H) 0.00 - 0.04 K/UL    DF AUTOMATED      PLATELET COMMENTS DECREASED PLATELETS      RBC COMMENTS ANISOCYTOSIS  2+        RBC COMMENTS TEARDROP CELLS  1+        RBC COMMENTS MICROCYTOSIS  2+        RBC COMMENTS OVALOCYTES  1+        RBC COMMENTS SCHISTOCYTES  1+       METABOLIC PANEL, BASIC    Collection Time: 10/12/22  9:58 AM   Result Value Ref Range    Sodium 141 136 - 145 mmol/L    Potassium 3.8 3.5 - 5.5 mmol/L    Chloride 109 100 - 111 mmol/L    CO2 28 21 - 32 mmol/L    Anion gap 4 3.0 - 18 mmol/L    Glucose 145 (H) 74 - 99 mg/dL    BUN 25 (H) 7.0 - 18 MG/DL    Creatinine 1.24 0.6 - 1.3 MG/DL    BUN/Creatinine ratio 20 12 - 20      eGFR >60 >60 ml/min/1.73m2    Calcium 8.7 8.5 - 10.1 MG/DL   URIC ACID    Collection Time: 10/12/22  9:58 AM   Result Value Ref Range    Uric acid 6.1 2.6 - 7.2 MG/DL   HEPATIC FUNCTION PANEL    Collection Time: 10/12/22  9:58 AM   Result Value Ref Range    Protein, total 6.4 6.4 - 8.2 g/dL    Albumin 3.0 (L) 3.4 - 5.0 g/dL    Globulin 3.4 2.0 - 4.0 g/dL    A-G Ratio 0.9 0.8 - 1.7      Bilirubin, total 1.3 (H) 0.2 - 1.0 MG/DL    Bilirubin, direct 0.5 (H) 0.0 - 0.2 MG/DL    Alk. phosphatase 43 (L) 45 - 117 U/L    AST (SGOT) 19 10 - 38 U/L    ALT (SGPT) 18 16 - 61 U/L   OCCULT BLOOD, STOOL    Collection Time: 10/12/22 11:23 AM   Result Value Ref Range    Occult blood, stool Negative NEG     CBC WITH AUTOMATED DIFF    Collection Time: 10/12/22 12:00 PM   Result Value Ref Range    WBC 3.0 (L) 4.6 - 13.2 K/uL    RBC 3.09 (L) 4.35 - 5.65 M/uL    HGB 9.1 (L) 13.0 - 16.0 g/dL    HCT 27.4 (L) 36.0 - 48.0 %    MCV 88.7 78.0 - 100.0 FL    MCH 29.4 24.0 - 34.0 PG    MCHC 33.2 31.0 - 37.0 g/dL    RDW 24.9 (H) 11.6 - 14.5 %    PLATELET 30 (L) 052 - 420 K/uL    NRBC 0.0 0  WBC    ABSOLUTE NRBC 0.00 0.00 - 0.01 K/uL    NEUTROPHILS 64 40 - 73 %    LYMPHOCYTES 20 (L) 21 - 52 %    MONOCYTES 5 3 - 10 %    EOSINOPHILS 4 0 - 5 %    BASOPHILS 0 0 - 2 %    IMMATURE GRANULOCYTES 7 (H) 0.0 - 0.5 %    ABS. NEUTROPHILS 1.9 1.8 - 8.0 K/UL    ABS. LYMPHOCYTES 0.6 (L) 0.9 - 3.6 K/UL    ABS. MONOCYTES 0.2 0.05 - 1.2 K/UL    ABS. EOSINOPHILS 0.1 0.0 - 0.4 K/UL    ABS. BASOPHILS 0.0 0.0 - 0.1 K/UL    ABS. IMM.  GRANS. 0.2 (H) 0.00 - 0.04 K/UL    DF AUTOMATED      PLATELET COMMENTS DECREASED PLATELETS      RBC COMMENTS ANISOCYTOSIS  2+        RBC COMMENTS SCHISTOCYTES  1+        RBC COMMENTS TEARDROP CELLS  2+        RBC COMMENTS OVALOCYTES  1+       PROTHROMBIN TIME + INR    Collection Time: 10/12/22  8:55 PM   Result Value Ref Range    Prothrombin time 16.3 (H) 11.5 - 15.2 sec    INR 1.3 (H) 0.8 - 1.2     METABOLIC PANEL, COMPREHENSIVE    Collection Time: 10/13/22  6:45 AM   Result Value Ref Range    Sodium 140 136 - 145 mmol/L    Potassium 4.8 3.5 - 5.5 mmol/L    Chloride 108 100 - 111 mmol/L    CO2 27 21 - 32 mmol/L    Anion gap 5 3.0 - 18 mmol/L    Glucose 84 74 - 99 mg/dL    BUN 19 (H) 7.0 - 18 MG/DL    Creatinine 0.97 0.6 - 1.3 MG/DL    BUN/Creatinine ratio 20 12 - 20      eGFR >60 >60 ml/min/1.73m2    Calcium 8.7 8.5 - 10.1 MG/DL    Bilirubin, total 0.9 0.2 - 1.0 MG/DL    ALT (SGPT) 22 16 - 61 U/L    AST (SGOT) 24 10 - 38 U/L    Alk. phosphatase 47 45 - 117 U/L    Protein, total 6.5 6.4 - 8.2 g/dL    Albumin 2.7 (L) 3.4 - 5.0 g/dL    Globulin 3.8 2.0 - 4.0 g/dL    A-G Ratio 0.7 (L) 0.8 - 1.7     CBC WITH AUTOMATED DIFF    Collection Time: 10/13/22  6:45 AM   Result Value Ref Range    WBC 3.1 (L) 4.6 - 13.2 K/uL    RBC 3.17 (L) 4.35 - 5.65 M/uL    HGB 9.2 (L) 13.0 - 16.0 g/dL    HCT 28.1 (L) 36.0 - 48.0 %    MCV 88.6 78.0 - 100.0 FL    MCH 29.0 24.0 - 34.0 PG    MCHC 32.7 31.0 - 37.0 g/dL    RDW 24.9 (H) 11.6 - 14.5 %    PLATELET 28 (LL) 450 - 420 K/uL    NRBC 0.0 0  WBC    ABSOLUTE NRBC 0.00 0.00 - 0.01 K/uL    NEUTROPHILS 54 40 - 73 %    LYMPHOCYTES 26 21 - 52 %    MONOCYTES 7 3 - 10 %    EOSINOPHILS 4 0 - 5 %    BASOPHILS 0 0 - 2 %    IMMATURE GRANULOCYTES 9 (H) 0.0 - 0.5 %    ABS. NEUTROPHILS 1.7 (L) 1.8 - 8.0 K/UL    ABS. LYMPHOCYTES 0.8 (L) 0.9 - 3.6 K/UL    ABS. MONOCYTES 0.2 0.05 - 1.2 K/UL    ABS. EOSINOPHILS 0.1 0.0 - 0.4 K/UL    ABS. BASOPHILS 0.0 0.0 - 0.1 K/UL    ABS. IMM.  GRANS. 0.3 (H) 0.00 - 0.04 K/UL    DF AUTOMATED      PLATELET COMMENTS DECREASED PLATELETS      RBC COMMENTS ANISOCYTOSIS  3+        RBC COMMENTS       SUBMITTED FOR PATHOLOGIST REVIEW  SCHISTOCYTES  1+      RBC COMMENTS TEARDROP CELLS  2+        RBC COMMENTS MICROCYTOSIS  2+        RBC COMMENTS OVALOCYTES  1+        RBC COMMENTS POIKILOCYTOSIS  3+       PROTHROMBIN TIME + INR    Collection Time: 10/13/22  6:45 AM   Result Value Ref Range    Prothrombin time 16.2 (H) 11.5 - 15.2 sec    INR 1.3 (H) 0.8 - 1.2         Radiology:   XR WRIST LT AP/LAT/OBL MIN 3V    Result Date: 10/12/2022  EXAM: XR WRIST LT AP/LAT/OBL MIN 3V, XR HAND LT MIN 3 V CLINICAL INDICATION/HISTORY: pain lateral wrist, possible trauma   > Additional: None. COMPARISON: None. TECHNIQUE: 3 views of the left wrist and hand. _______________ FINDINGS: BONES: No evidence of acute fracture or dislocation. No aggressive appearing osseous lytic or blastic lesion. SOFT TISSUES: Radiodense wire wire appearing material within the dorsal soft tissues overlying the wrist. _______________     No evidence of acute fracture or dislocation. Radiodense wire wire appearing material within the dorsal soft tissues overlying the wrist. Correlate clinically. XR HAND LT MIN 3 V    Result Date: 10/12/2022  EXAM: XR WRIST LT AP/LAT/OBL MIN 3V, XR HAND LT MIN 3 V CLINICAL INDICATION/HISTORY: pain lateral wrist, possible trauma   > Additional: None. COMPARISON: None. TECHNIQUE: 3 views of the left wrist and hand. _______________ FINDINGS: BONES: No evidence of acute fracture or dislocation. No aggressive appearing osseous lytic or blastic lesion. SOFT TISSUES: Radiodense wire wire appearing material within the dorsal soft tissues overlying the wrist. _______________     No evidence of acute fracture or dislocation. Radiodense wire wire appearing material within the dorsal soft tissues overlying the wrist. Correlate clinically. XR FOOT RT MIN 3 V    Result Date: 10/12/2022  EXAM: XR FOOT RT MIN 3 V CLINICAL INDICATION/HISTORY: pain/swelling/warmth 1st MTP   > Additional: None. COMPARISON: None. TECHNIQUE: 3 views of the right foot. _______________ FINDINGS: BONES: No evidence of acute fracture or dislocation. Hallux valgus and first MTP joint arthrosis. No aggressive appearing osseous lytic or blastic lesion. SOFT TISSUES: Unremarkable. _______________     No evidence of acute fracture or dislocation. Hallux valgus and first MTP joint arthrosis. Thanks for the consult. We will follow!         MD ROHAN Young Hematology/Oncology

## 2022-10-13 NOTE — PROGRESS NOTES
Comprehensive Nutrition Assessment    Type and Reason for Visit: Initial, Positive nutrition screen    Nutrition Recommendations/Plan:   Advance diet as medically appropriate. Recommend adding ONS-ensure TID to help meet needs     Malnutrition Assessment:  Malnutrition Status:  Severe malnutrition (10/13/22 1319)    Context:  Acute illness     Findings of the 6 clinical characteristics of malnutrition:   Energy Intake:  50% or less of est energy requirements for 5 or more days  Weight Loss:  5% over one month     Body Fat Loss: Moderate body fat loss, Buccal region, Orbital   Muscle Mass Loss: Moderate muscle mass loss, Temples (temporalis), Thigh (quadriceps), Clavicles (pectoralis & deltoids)  Fluid Accumulation:  No significant fluid accumulation,     Strength:  Not performed     Nutrition Assessment:    72yoM H/o esophagea varices, HTN, blood cancer, portal HTN. Wt hx: 136lb (10/13/22), 145lb 9/12/22), 143 (6/15/22)- significant 9lb (6%) wt loss in 1 month. Admitted 10/12/22 with left hand/wrist swelling and right big toe pain. Pt is currently NPO for procedure (bone marrow biopsy). Per pt- has had a decrease appetite and lost ~30-40lbs in the last 5-6 months. States his normal wt is ~155-160lb. significant wt loss note din chart and NFPE confirmed fat and muscle loss. Pt recently has last of his teeth pulled and would likely benefit from a soft diet when advanced. Nutrition Related Findings:    BM 10/10/22. Labs: none pertinent. Meds: none pertinent. Wound Type: None    Current Nutrition Intake & Therapies:  Average Meal Intake: NPO  Average Supplement Intake: NPO  DIET NPO    Anthropometric Measures:  Height: 5' 5.98\" (167.6 cm)  Ideal Body Weight (IBW): 142 lbs (65 kg)  Admission Body Weight: 136 lb 0.4 oz  Current Body Wt:  61.7 kg (136 lb 0.4 oz), 95.8 % IBW.  Bed scale  Current BMI (kg/m2): 22  Usual Body Weight: 70.3 kg (155 lb)  % Weight Change (Calculated): -12.2  Weight Adjustment: No adjustment                 BMI Category: Underweight (BMI less than 22) age over 72    Estimated Daily Nutrient Needs:  Energy Requirements Based On: Kcal/kg  Weight Used for Energy Requirements: Current  Energy (kcal/day): 0127-9517     Protein (g/day): 75-93  Method Used for Fluid Requirements: 1 ml/kcal  Fluid (ml/day): 4682-2065    Nutrition Diagnosis:   Inadequate oral intake related to acute injury/trauma as evidenced by NPO or clear liquid status due to medical condition    Nutrition Interventions:   Food and/or Nutrient Delivery: Start oral diet, Start oral nutrition supplement  Nutrition Education/Counseling: No recommendations at this time  Coordination of Nutrition Care: Continue to monitor while inpatient       Goals:     Goals: Initiate PO diet, by next RD assessment       Nutrition Monitoring and Evaluation:   Behavioral-Environmental Outcomes: None identified  Food/Nutrient Intake Outcomes: Diet advancement/tolerance, Food and nutrient intake, Supplement intake  Physical Signs/Symptoms Outcomes: Biochemical data, Chewing or swallowing, Meal time behavior, GI status, Weight, Nutrition focused physical findings, Skin    Discharge Planning:     Too soon to determine    [employer],

## 2022-10-14 VITALS
OXYGEN SATURATION: 100 % | HEART RATE: 59 BPM | WEIGHT: 136 LBS | TEMPERATURE: 98.4 F | SYSTOLIC BLOOD PRESSURE: 99 MMHG | DIASTOLIC BLOOD PRESSURE: 58 MMHG | RESPIRATION RATE: 16 BRPM | BODY MASS INDEX: 21.86 KG/M2 | HEIGHT: 66 IN

## 2022-10-14 LAB
ALBUMIN SERPL-MCNC: 2.8 G/DL (ref 3.4–5)
ALBUMIN/GLOB SERPL: 0.8 {RATIO} (ref 0.8–1.7)
ALP SERPL-CCNC: 46 U/L (ref 45–117)
ALT SERPL-CCNC: 22 U/L (ref 16–61)
ANION GAP SERPL CALC-SCNC: 5 MMOL/L (ref 3–18)
AST SERPL-CCNC: 17 U/L (ref 10–38)
BASOPHILS # BLD: 0 K/UL (ref 0–0.1)
BASOPHILS NFR BLD: 0 % (ref 0–2)
BILIRUB SERPL-MCNC: 0.7 MG/DL (ref 0.2–1)
BUN SERPL-MCNC: 20 MG/DL (ref 7–18)
BUN/CREAT SERPL: 20 (ref 12–20)
CALCIUM SERPL-MCNC: 8.5 MG/DL (ref 8.5–10.1)
CHLORIDE SERPL-SCNC: 106 MMOL/L (ref 100–111)
CO2 SERPL-SCNC: 27 MMOL/L (ref 21–32)
CREAT SERPL-MCNC: 0.98 MG/DL (ref 0.6–1.3)
DIFFERENTIAL METHOD BLD: ABNORMAL
EOSINOPHIL # BLD: 0.1 K/UL (ref 0–0.4)
EOSINOPHIL NFR BLD: 3 % (ref 0–5)
ERYTHROCYTE [DISTWIDTH] IN BLOOD BY AUTOMATED COUNT: 24.8 % (ref 11.6–14.5)
GLOBULIN SER CALC-MCNC: 3.4 G/DL (ref 2–4)
GLUCOSE SERPL-MCNC: 91 MG/DL (ref 74–99)
HCT VFR BLD AUTO: 28.6 % (ref 36–48)
HGB BLD-MCNC: 9.3 G/DL (ref 13–16)
IMM GRANULOCYTES # BLD AUTO: 0 K/UL
IMM GRANULOCYTES NFR BLD AUTO: 0 %
INR PPP: 1.2 (ref 0.8–1.2)
LYMPHOCYTES # BLD: 1.3 K/UL (ref 0.9–3.6)
LYMPHOCYTES NFR BLD: 39 % (ref 21–52)
MCH RBC QN AUTO: 29.6 PG (ref 24–34)
MCHC RBC AUTO-ENTMCNC: 32.5 G/DL (ref 31–37)
MCV RBC AUTO: 91.1 FL (ref 78–100)
MONOCYTES # BLD: 0.2 K/UL (ref 0.05–1.2)
MONOCYTES NFR BLD: 6 % (ref 3–10)
NEUTS SEG # BLD: 1.8 K/UL (ref 1.8–8)
NEUTS SEG NFR BLD: 52 % (ref 40–73)
NRBC # BLD: 0 K/UL (ref 0–0.01)
NRBC BLD-RTO: 0 PER 100 WBC
PLATELET # BLD AUTO: 32 K/UL (ref 135–420)
PLATELET COMMENTS,PCOM: ABNORMAL
POTASSIUM SERPL-SCNC: 4.8 MMOL/L (ref 3.5–5.5)
PROT SERPL-MCNC: 6.2 G/DL (ref 6.4–8.2)
PROTHROMBIN TIME: 16 SEC (ref 11.5–15.2)
RBC # BLD AUTO: 3.14 M/UL (ref 4.35–5.65)
RBC MORPH BLD: ABNORMAL
SODIUM SERPL-SCNC: 138 MMOL/L (ref 136–145)
WBC # BLD AUTO: 3.4 K/UL (ref 4.6–13.2)

## 2022-10-14 PROCEDURE — 85025 COMPLETE CBC W/AUTO DIFF WBC: CPT

## 2022-10-14 PROCEDURE — 74011250637 HC RX REV CODE- 250/637: Performed by: HOSPITALIST

## 2022-10-14 PROCEDURE — 85610 PROTHROMBIN TIME: CPT

## 2022-10-14 PROCEDURE — 80053 COMPREHEN METABOLIC PANEL: CPT

## 2022-10-14 PROCEDURE — 36415 COLL VENOUS BLD VENIPUNCTURE: CPT

## 2022-10-14 PROCEDURE — 74011636637 HC RX REV CODE- 636/637: Performed by: HOSPITALIST

## 2022-10-14 RX ORDER — PREDNISONE 20 MG/1
20 TABLET ORAL
Qty: 7 TABLET | Refills: 0 | Status: SHIPPED | OUTPATIENT
Start: 2022-10-15 | End: 2022-10-22

## 2022-10-14 RX ADMIN — PREDNISONE 20 MG: 20 TABLET ORAL at 08:33

## 2022-10-14 RX ADMIN — LOSARTAN POTASSIUM 100 MG: 50 TABLET, FILM COATED ORAL at 08:33

## 2022-10-14 RX ADMIN — TRAMADOL HYDROCHLORIDE 50 MG: 50 TABLET ORAL at 08:33

## 2022-10-14 NOTE — PROGRESS NOTES
Problem: Pain  Goal: *Control of Pain  Outcome: Progressing Towards Goal     Problem: Nutrition Deficit  Goal: *Optimize nutritional status  Outcome: Progressing Towards Goal     Problem: Patient Education: Go to Patient Education Activity  Goal: Patient/Family Education  Outcome: Progressing Towards Goal     Problem: Falls - Risk of  Goal: *Absence of Falls  Description: Document Corinne Jackson Fall Risk and appropriate interventions in the flowsheet. Outcome: Progressing Towards Goal  Note: Fall Risk Interventions:            Medication Interventions: Teach patient to arise slowly         History of Falls Interventions:  Investigate reason for fall, Assess for delayed presentation/identification of injury for 48 hrs (comment for end date)         Problem: Patient Education: Go to Patient Education Activity  Goal: Patient/Family Education  Outcome: Progressing Towards Goal

## 2022-10-14 NOTE — PROGRESS NOTES
Nutrition Note    Pt diet advanced from NPO to regular. Pt was seen yesterday; note full assessment 10/13/22    Will order ensure TID to support nutritional needs. Continue with PO diet order. Monitor intake of meals and supplement.      Electronically signed by Desmond Joaquin RD on 10/14/2022 at 1:24 PM

## 2022-10-14 NOTE — PROGRESS NOTES
Discharge orders received, IV removed, patient alert, daughter at bedside, AVS reviewed with patient and daughter, all questions encouraged and answered. Patient taken downstairs to lobby via wheelchair by staff.

## 2022-10-14 NOTE — CONSULTS
Phone: [phone]  Paging : [phone]     Hematology/Oncology Consult Note    Patient: Anayeli Posadas MRN: [id number]  CSN: [id number]    YOB: 1950  Age: 81 y.o. Sex: male    DOA: 10/12/2022 LOS:  LOS: 2 days            REASON FOR CONSULTATION:   Pancytopenia      ASSESSMENT:   Mr. Merrill Vega is a 80 y/o M with a history of JAK2 [id number]+ Polycythemia Vera who presented to the ED with L wrist and R ankle pain, was found to have pancytopenia. Hematology consulted on management:      PLAN:   #Pancytopenia:  Cytopenias stable. Possible hydrea toxicity vs. Progression of PV to myelofibrosis vs.MDS/leukemia. Will need BMBx. Smear with pancytopenia, some schistocytes, no other noted issues.    - Ordered bone marrow biopsy, consult to IR placed              - Send for morphology, cytogenetics, FISH per path discretion, and next generation sequencing   - If BMBx not done today and patient is ready for discharge, please discharge patient and we will arrange BMBx as outpatient as counts are stable. - Hold hydrea for the time being and at discharge  - Hold aspirin for now given thrombocytopenia and at discharge  - Follow up flow cytometry from peripheral blood     #Tenosynovitis of Wrist, ?R ankle  - Continue prednisone and intraarticular joint injection per primary and ortho      HPI:     Anayeli Posadas is a 81 y.o., BLACK/, male, who I have been asked to see for pancytopenia. He has been having pain in his L wrist and R ankle for the last 6 months but in the last few days, his pain worsened to the point where he was unable to bend the respective joints and pain with minimal tenderness. He presented to the hospital and was found to be panycytopenic. He denies any fevers, chills, worsening abdominal pain, nausea, vomiting, or rash. He isn't sure what his dose of hydrea is but speaking with his daughter, he make be taking 1000mg PO daily. He followed with Dr. Gwen Chamberlain. SUBJECTIVE:    No new complaints, feels well overall. States L wrist and R ankle are improving but still painful, difficult to move. Past Medical History:   Diagnosis Date    Arthritis     Esophageal varices (HCC)     Hypertension     Mesenteric lymphadenitis     Polycythemia vera ([address]     Portal hypertension (HCC)     Prostate enlargement     Splenomegaly        Past Surgical History:   Procedure Laterality Date    HX ORTHOPAEDIC Left 1970's    hand surgery    HX ORTHOPAEDIC  2015    buldging disk in neck       History reviewed. No pertinent family history. Social History     Socioeconomic History    Marital status:    Tobacco Use    Smoking status: Former    Smokeless tobacco: Never   Substance and Sexual Activity    Alcohol use: No    Drug use: No       Current Facility-Administered Medications   Medication Dose Route Frequency    traMADoL (ULTRAM) tablet 50 mg  50 mg Oral BID    losartan (COZAAR) tablet 100 mg  100 mg Oral DAILY    predniSONE (DELTASONE) tablet 20 mg  20 mg Oral DAILY WITH BREAKFAST       Prior to Admission medications    Medication Sig Start Date End Date Taking? Authorizing Provider   diphenhydrAMINE (BENADRYL) 12.5 mg/5 mL oral liquid Take 12.5 mg by mouth four (4) times daily as needed. Yes Provider, Historical   traMADoL (ULTRAM) 50 mg tablet Take 50 mg by mouth two (2) times a day. Yes Provider, Historical   naproxen (NAPROSYN) 500 mg tablet Take 500 mg by mouth two (2) times daily (with meals). Yes Provider, Historical   tamsulosin (FLOMAX) 0.4 mg capsule Take 0.4 mg by mouth daily. Yes Provider, Historical   diclofenac (Voltaren) 1 % gel Apply  to affected area as needed for Pain. Yes Provider, Historical   losartan (COZAAR) 50 mg tablet Take 100 mg by mouth daily. Instructed to take morning of surgery with small sip of water   Indications: HYPERTENSION   Yes Provider, Historical   hydroxyurea (HYDREA) 500 mg capsule Take 1,000 mg by mouth daily.     Provider, Historical       No Known Allergies      ROS:     CONSTITUTION: No fevers, chills, night sweats, anorexia, or weight loss. HEENT: No visual changes. No change in hearing acuity, tinnitus, hoarseness, sore throat, or postnasal drip. CARDIOVASCULAR: No chest pain, palpitations, or syncope. No extremity edema. RESPIRATORY: No shortness of breath, cough, wheezing, or hemoptysis. GASTROINTESINAL: No dysphagia, odynophagia, nausea, or vomiting. No reflux symptoms, abdominal pain, or bloating. No constipation, diarrhea, or rectal bleeding. GENITOURINARY: no complaints. NEUROLOGICALl: No diplopia, dysarthria, weakness, or headaches. No seizures, focal weakness, or abnormal gait. PSYCHIATRIC: No anxiety, depression, or memory loss. HEMATOLOGIC: No easy bruising. No unusual bleeding. No enlarged lymph nodes. MUSCULOSKELETAL: No unusual joint or muscle ache. SKIN: No rash or pruritus. Physical Exam:      VITAL SIGNS: Patient Vitals for the past 24 hrs:   BP Temp Pulse Resp SpO2 Height   10/14/22 0721 113/73 97.9 °F (36.6 °C) (!) 53 16 100 % --   10/14/22 0413 122/68 97.6 °F (36.4 °C) (!) 49 16 100 % --   10/14/22 0017 107/63 98 °F (36.7 °C) (!) 52 16 98 % --   10/13/22 2336 (!) 122/59 97.2 °F (36.2 °C) (!) 55 17 100 % --   10/13/22 2327 -- -- -- -- 100 % --   10/13/22 1522 103/66 98 °F (36.7 °C) (!) 54 18 100 % --   10/13/22 1305 -- -- -- -- -- 5' 5.98\" (1.676 m)   10/13/22 1051 114/65 97.9 °F (36.6 °C) (!) 57 18 100 % --   10/13/22 0944 -- -- -- -- 100 % --       GENERAL: normal appearance, no acute distress. HEENT: conjunctivae normal, eyelids normal, PERRLA, anicteric, external ears and nose normal, hearing grossly normal.   NECK: supple, no masses. LUNG: breathing comfortably, lungs clear to auscultation and percussion. CARDIOVASCULAR: normal heart sounds, heart rhythm regular, no peripheral edema. ABDOMEN: soft.  bowel sounds normal, non-tender non distension, no hepatosplenomegaly  PELVIS: pelvic exam deferred. RECTUM: rectal exam deferred. LYMPH NODES: no cervical adenopathy, no axillary adenopathy, no supraclavicular adenopathy, no infraclavicular adenopathy. SKIN: no rash, no petechiae, no ecchymosis, no pallor, no cutaneous nodules. MUSCULOSKELETAL: swelling diffusely of L hand up to wrist and R ankle, minimal erythema of both joints diffusely. NEUROLOGIC: no focal motor deficit, normal gait, no abnormal mental status. PSYCHIATRIC: oriented to person, time and place, mood and affect appropriate to situation. Ancillary Studies:     Bloodwork:  Recent Results (from the past 24 hour(s))   METABOLIC PANEL, COMPREHENSIVE    Collection Time: 10/14/22  5:03 AM   Result Value Ref Range    Sodium 138 136 - 145 mmol/L    Potassium 4.8 3.5 - 5.5 mmol/L    Chloride 106 100 - 111 mmol/L    CO2 27 21 - 32 mmol/L    Anion gap 5 3.0 - 18 mmol/L    Glucose 91 74 - 99 mg/dL    BUN 20 (H) 7.0 - 18 MG/DL    Creatinine 0.98 0.6 - 1.3 MG/DL    BUN/Creatinine ratio 20 12 - 20      eGFR >60 >60 ml/min/1.73m2    Calcium 8.5 8.5 - 10.1 MG/DL    Bilirubin, total 0.7 0.2 - 1.0 MG/DL    ALT (SGPT) 22 16 - 61 U/L    AST (SGOT) 17 10 - 38 U/L    Alk. phosphatase 46 45 - 117 U/L    Protein, total 6.2 (L) 6.4 - 8.2 g/dL    Albumin 2.8 (L) 3.4 - 5.0 g/dL    Globulin 3.4 2.0 - 4.0 g/dL    A-G Ratio 0.8 0.8 - 1.7     CBC WITH AUTOMATED DIFF    Collection Time: 10/14/22  5:03 AM   Result Value Ref Range    WBC 3.4 (L) 4.6 - 13.2 K/uL    RBC 3.14 (L) 4.35 - 5.65 M/uL    HGB 9.3 (L) 13.0 - 16.0 g/dL    HCT 28.6 (L) 36.0 - 48.0 %    MCV 91.1 78.0 - 100.0 FL    MCH 29.6 24.0 - 34.0 PG    MCHC 32.5 31.0 - 37.0 g/dL    RDW 24.8 (H) 11.6 - 14.5 %    PLATELET 32 (L) 424 - 420 K/uL    NRBC 0.0 0  WBC    ABSOLUTE NRBC 0.00 0.00 - 0.01 K/uL    NEUTROPHILS 52 40 - 73 %    LYMPHOCYTES 39 21 - 52 %    MONOCYTES 6 3 - 10 %    EOSINOPHILS 3 0 - 5 %    BASOPHILS 0 0 - 2 %    IMMATURE GRANULOCYTES 0 %    ABS.  NEUTROPHILS 1.8 1.8 - 8.0 K/UL    ABS. LYMPHOCYTES 1.3 0.9 - 3.6 K/UL    ABS. MONOCYTES 0.2 0.05 - 1.2 K/UL    ABS. EOSINOPHILS 0.1 0.0 - 0.4 K/UL    ABS. BASOPHILS 0.0 0.0 - 0.1 K/UL    ABS. IMM. GRANS. 0.0 K/UL    DF MANUAL      PLATELET COMMENTS DECREASED PLATELETS      RBC COMMENTS MACROCYTOSIS  1+        RBC COMMENTS MICROCYTOSIS  1+        RBC COMMENTS HYPOCHROMIA  1+        RBC COMMENTS OVALOCYTES  1+        RBC COMMENTS SCHISTOCYTES  1+        RBC COMMENTS ANISOCYTOSIS  3+        RBC COMMENTS TARGET CELLS  FEW  TEARDROP CELLS  1+        RBC COMMENTS POIKILOCYTOSIS  3+       PROTHROMBIN TIME + INR    Collection Time: 10/14/22  5:03 AM   Result Value Ref Range    Prothrombin time 16.0 (H) 11.5 - 15.2 sec    INR 1.2 0.8 - 1.2         Radiology:   XR WRIST LT AP/LAT/OBL MIN 3V    Result Date: 10/12/2022  EXAM: XR WRIST LT AP/LAT/OBL MIN 3V, XR HAND LT MIN 3 V CLINICAL INDICATION/HISTORY: pain lateral wrist, possible trauma   > Additional: None. COMPARISON: None. TECHNIQUE: 3 views of the left wrist and hand. _______________ FINDINGS: BONES: No evidence of acute fracture or dislocation. No aggressive appearing osseous lytic or blastic lesion. SOFT TISSUES: Radiodense wire wire appearing material within the dorsal soft tissues overlying the wrist. _______________     No evidence of acute fracture or dislocation. Radiodense wire wire appearing material within the dorsal soft tissues overlying the wrist. Correlate clinically. XR HAND LT MIN 3 V    Result Date: 10/12/2022  EXAM: XR WRIST LT AP/LAT/OBL MIN 3V, XR HAND LT MIN 3 V CLINICAL INDICATION/HISTORY: pain lateral wrist, possible trauma   > Additional: None. COMPARISON: None. TECHNIQUE: 3 views of the left wrist and hand. _______________ FINDINGS: BONES: No evidence of acute fracture or dislocation. No aggressive appearing osseous lytic or blastic lesion.  SOFT TISSUES: Radiodense wire wire appearing material within the dorsal soft tissues overlying the wrist. _______________ No evidence of acute fracture or dislocation. Radiodense wire wire appearing material within the dorsal soft tissues overlying the wrist. Correlate clinically. XR FOOT RT MIN 3 V    Result Date: 10/12/2022  EXAM: XR FOOT RT MIN 3 V CLINICAL INDICATION/HISTORY: pain/swelling/warmth 1st MTP   > Additional: None. COMPARISON: None. TECHNIQUE: 3 views of the right foot. _______________ FINDINGS: BONES: No evidence of acute fracture or dislocation. Hallux valgus and first MTP joint arthrosis. No aggressive appearing osseous lytic or blastic lesion. SOFT TISSUES: Unremarkable. _______________     No evidence of acute fracture or dislocation. Hallux valgus and first MTP joint arthrosis. Thanks for the consult. We will follow!         Agnes Dominguez MD  [facility]

## 2022-10-14 NOTE — DISCHARGE SUMMARY
Discharge Summary    Patient: Iqra Pyle MRN: [id number]  CSN: [id number]    YOB: 1950  Age: 81 y.o. Sex: male    DOA: 10/12/2022 LOS:  LOS: 2 days   Discharge Date:      Primary Care Provider:  Julien Oliveros MD    Admission Diagnoses: Pancytopenia [facility]) [D61.818]    Discharge Diagnoses:    Problem List as of 10/14/2022 Date Reviewed: 6/8/2015            Codes Class Noted - Resolved    Severe protein-calorie malnutrition ([facility] ICD-10-CM: E43  ICD-9-CM: 262  10/13/2022 - Present        * (Principal) Pancytopenia ([facility] ICD-10-CM: C61.714  ICD-9-CM: 284.19  10/12/2022 - Present        Polycythemia vera ([facility] ICD-10-CM: D45  ICD-9-CM: 238.4  10/12/2022 - Present        Mesenteric lymphadenitis ICD-10-CM: I88.0  ICD-9-CM: 289.2  10/12/2022 - Present        Portal hypertension ([facility] ICD-10-CM: K76.6  ICD-9-CM: 572.3  10/12/2022 - Present        Splenomegaly ICD-10-CM: R16.1  ICD-9-CM: 789.2  10/12/2022 - Present        Esophageal varices ([facility] ICD-10-CM: I85.00  ICD-9-CM: 456.1  10/12/2022 - Present        Hypertension ICD-10-CM: I10  ICD-9-CM: 401.9  10/12/2022 - Present        Prostate enlargement ICD-10-CM: N40.0  ICD-9-CM: 600.00  10/12/2022 - Present        DDD (degenerative disc disease), cervical ICD-10-CM: M50.30  ICD-9-CM: 722.4  6/8/2015 - Present        RESOLVED: Cervical spinal stenosis ICD-10-CM: M48.02  ICD-9-CM: 723.0  6/8/2015 - 6/17/2015        RESOLVED: HNP (herniated nucleus pulposus), cervical ICD-10-CM: M50.20  ICD-9-CM: 722.0  6/8/2015 - 6/17/2015           Discharge Medications:     Current Discharge Medication List        START taking these medications    Details   predniSONE (DELTASONE) 20 mg tablet Take 1 Tablet by mouth daily (with breakfast) for 7 days.   Qty: 7 Tablet, Refills: 0  Start date: 10/15/2022, End date: 10/22/2022           CONTINUE these medications which have NOT CHANGED    Details   diphenhydrAMINE (BENADRYL) 12.5 mg/5 mL oral liquid Take 12.5 mg by mouth four (4) times daily as needed. traMADoL (ULTRAM) 50 mg tablet Take 50 mg by mouth two (2) times a day. naproxen (NAPROSYN) 500 mg tablet Take 500 mg by mouth two (2) times daily (with meals). tamsulosin (FLOMAX) 0.4 mg capsule Take 0.4 mg by mouth daily. diclofenac (Voltaren) 1 % gel Apply  to affected area as needed for Pain.      losartan (COZAAR) 50 mg tablet Take 100 mg by mouth daily. Instructed to take morning of surgery with small sip of water   Indications: HYPERTENSION           STOP taking these medications       hydroxyurea (HYDREA) 500 mg capsule Comments:   Reason for Stopping:               Discharge Condition: Good    Procedures : None    Consults: Hematology/Oncology and Orthopedics      PHYSICAL EXAM   Visit Vitals  BP (!) 99/58   Pulse (!) 59   Temp 98.4 °F (36.9 °C)   Resp 16   Ht 5' 5.98\" (1.676 m)   Wt 61.7 kg (136 lb)   SpO2 100%   BMI 21.96 kg/m²     General: Awake, cooperative, no acute distress    HEENT: NC, Atraumatic. PERRLA, EOMI. Anicteric sclerae. Lungs:  CTA Bilaterally. No Wheezing/Rhonchi/Rales. Heart:  Regular  rhythm,  No murmur, No Rubs, No Gallops  Abdomen: Soft, Non distended, Non tender. +Bowel sounds,   Extremities: Left hand and wrist swelling. Psych:   Not anxious or agitated. Neurologic:  No acute neurological deficits. Admission HPI :   Annel Lopez is a 81 y.o. male with HTN, arthritis, esophageal varices, polycythemia vera on Hydrea, portal hypertension, esophageal varices, mesenteric lymphadenitis, splenomegaly, prostate enlargement presents to ER with complaints of left hand/wrist swelling and right great toe pain. Patient reports that the pain has been there for few months. He has seen his PCP, he was placed on prednisone. X-ray did not show any acute findings. Per son-in-law who brought the patient his left wrist swelling comes and goes.   He has history of polycythemia vera and takes Hydrea, he is followed by Dr. Gwen Chamberlain. Called and discussed with hematology Dr. Trisha Dobbins. Patient has history of polycythemia vera and he is on Hydrea. He has baseline anemia. However his WBC and platelets were normal.  In ER he is noted to have WBC of 3, H&H of 9.1/27.4, platelets of 30. No acute findings on x-ray of right foot and left hand. Hospital Course :   Mr. Gege Lyman was admitted to medical floor, she was seen and followed by hematology, and seen by hand surgery. Pancytopenia -  In a patient with history of polycythemia vera, on hydrea. Etiology possible hydrea toxicity vs progression of PV to myelofibrosis vs MDS/leukemia. Hydrea stopped per hematology recommendation  Bone marrow biopsy as outpatient  Follow work up morphology, cytogenetics, FISH per path discretion, and next generation sequencing. HTN -  Continue home medications   Monitor BP     Left hand/wrist swelling and pain -  Unclear cause, X-ray reviewed  on prednisone  Pain control  Hand surgery consulted. Recommend supportive care     Portal HTN -  Monitored liver function. Discussed with daughter about discharge plan and follow up.     Activity: Activity as tolerated    Diet: Regular Diet    Follow-up: PCP, hematology    Disposition: home    Minutes spent on discharge: 45       Labs: Results:       Chemistry Recent Labs     10/14/22  0503 10/13/22  0645 10/12/22  0958   GLU 91 84 145*    140 141   K 4.8 4.8 3.8    108 109   CO2 27 27 28   BUN 20* 19* 25*   CREA 0.98 0.97 1.24   CA 8.5 8.7 8.7   AGAP 5 5 4   BUCR 20 20 20   AP 46 47 43*   TP 6.2* 6.5 6.4   ALB 2.8* 2.7* 3.0*   GLOB 3.4 3.8 3.4   AGRAT 0.8 0.7* 0.9      CBC w/Diff Recent Labs     10/14/22  0503 10/13/22  0645 10/12/22  1200   WBC 3.4* 3.1* 3.0*   RBC 3.14* 3.17* 3.09*   HGB 9.3* 9.2* 9.1*   HCT 28.6* 28.1* 27.4*   PLT 32* 28* 30*   GRANS 52 54 64   LYMPH 39 26 20*   EOS 3 4 4      Cardiac Enzymes No results for input(s): CPK, CKND1, EMERY in the last 72 hours.    No lab exists for component: CKRMB, TROIP   Coagulation Recent Labs     10/14/22  0503 10/13/22  0645   PTP 16.0* 16.2*   INR 1.2 1.3*       Lipid Panel No results found for: CHOL, CHOLPOCT, CHOLX, CHLST, CHOLV, 961587, HDL, HDLP, LDL, LDLC, DLDLP, 026070, VLDLC, VLDL, TGLX, TRIGL, TRIGP, TGLPOCT, CHHD, CHHDX   BNP No results for input(s): BNPP in the last 72 hours. Liver Enzymes Recent Labs     10/14/22  0503   TP 6.2*   ALB 2.8*   AP 46      Thyroid Studies No results found for: T4, T3U, TSH, TSHEXT         Significant Diagnostic Studies: XR WRIST LT AP/LAT/OBL MIN 3V    Result Date: 10/12/2022  EXAM: XR WRIST LT AP/LAT/OBL MIN 3V, XR HAND LT MIN 3 V CLINICAL INDICATION/HISTORY: pain lateral wrist, possible trauma   > Additional: None. COMPARISON: None. TECHNIQUE: 3 views of the left wrist and hand. _______________ FINDINGS: BONES: No evidence of acute fracture or dislocation. No aggressive appearing osseous lytic or blastic lesion. SOFT TISSUES: Radiodense wire wire appearing material within the dorsal soft tissues overlying the wrist. _______________     No evidence of acute fracture or dislocation. Radiodense wire wire appearing material within the dorsal soft tissues overlying the wrist. Correlate clinically. XR HAND LT MIN 3 V    Result Date: 10/12/2022  EXAM: XR WRIST LT AP/LAT/OBL MIN 3V, XR HAND LT MIN 3 V CLINICAL INDICATION/HISTORY: pain lateral wrist, possible trauma   > Additional: None. COMPARISON: None. TECHNIQUE: 3 views of the left wrist and hand. _______________ FINDINGS: BONES: No evidence of acute fracture or dislocation. No aggressive appearing osseous lytic or blastic lesion. SOFT TISSUES: Radiodense wire wire appearing material within the dorsal soft tissues overlying the wrist. _______________     No evidence of acute fracture or dislocation. Radiodense wire wire appearing material within the dorsal soft tissues overlying the wrist. Correlate clinically.     XR FOOT RT MIN 3 V    Result Date: 10/12/2022  EXAM: XR FOOT RT MIN 3 V CLINICAL INDICATION/HISTORY: pain/swelling/warmth 1st MTP   > Additional: None. COMPARISON: None. TECHNIQUE: 3 views of the right foot. _______________ FINDINGS: BONES: No evidence of acute fracture or dislocation. Hallux valgus and first MTP joint arthrosis. No aggressive appearing osseous lytic or blastic lesion. SOFT TISSUES: Unremarkable. _______________     No evidence of acute fracture or dislocation. Hallux valgus and first MTP joint arthrosis. No results found for this or any previous visit. Please note that this dictation was completed with [employer], the computer voice recognition software. Quite often unanticipated grammatical, syntax, homophones, and other interpretive errors are inadvertently transcribed by the computer software. Please disregard these errors. Please excuse any errors that have escaped final proofreading.      CC: Bettie Hummel MD
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
## 2022-10-14 NOTE — PROGRESS NOTES
Problem: Pain  Goal: *Control of Pain  Outcome: Progressing Towards Goal     Problem: Nutrition Deficit  Goal: *Optimize nutritional status  Outcome: Progressing Towards Goal     Problem: Patient Education: Go to Patient Education Activity  Goal: Patient/Family Education  Outcome: Progressing Towards Goal     Problem: Falls - Risk of  Goal: *Absence of Falls  Description: Document Yen Don Fall Risk and appropriate interventions in the flowsheet.   Outcome: Progressing Towards Goal  Note: Fall Risk Interventions:            Medication Interventions: Patient to call before getting OOB, Teach patient to arise slowly         History of Falls Interventions: Consult care management for discharge planning, Door open when patient unattended, Investigate reason for fall, Room close to nurse's station         Problem: Patient Education: Go to Patient Education Activity  Goal: Patient/Family Education  Outcome: Progressing Towards Goal